# Patient Record
Sex: FEMALE | Race: WHITE | NOT HISPANIC OR LATINO | URBAN - METROPOLITAN AREA
[De-identification: names, ages, dates, MRNs, and addresses within clinical notes are randomized per-mention and may not be internally consistent; named-entity substitution may affect disease eponyms.]

---

## 2018-01-25 NOTE — PATIENT DISCUSSION
Discussed the risks/benefits of laser capsulotomy. Laser recommended. Patient elects to proceed. Plan OD then OS.

## 2018-02-21 NOTE — PROCEDURE NOTE: SURGICAL
Prior to commencing surgery patient identification, surgical procedure, site, and side were confirmed by Dr. Mary Hansen. Following topical proparacaine anesthesia, the patient was positioned at the YAG laser, a contact lens coupled to the cornea with methylcellulose and an axial posterior capsulotomy performed without complication using 2.3 Mj x 47. Excess methylcellulose was washed from the eye, one drop of Alphagan was instilled and the patient returned to the holding area having tolerated the procedure well and without complication. <br />[facility]:[id number]

## 2018-02-28 NOTE — PROCEDURE NOTE: SURGICAL
Prior to commencing surgery patient identification, surgical procedure, site, and side were confirmed by Dr. Sherice España. Following topical proparacaine anesthesia, the patient was positioned at the YAG laser, a contact lens coupled to the cornea with methylcellulose and an axial posterior capsulotomy performed without complication using 2.6 Mj x 48. Excess methylcellulose was washed from the eye, one drop of Alphagan was instilled and the patient returned to the holding area having tolerated the procedure well and without complication. <br />[facility] [place]

## 2020-07-16 NOTE — PATIENT DISCUSSION
RECOMMEND LOWER IOP OU.  HOLD LATANOPROST.  START ROCKLATAN OU QHS.   CONTINUE BRIMONIDINE.  IF IOP NOT CONTROLLED, TC REPEATING SLT OR GLC SURGERY.

## 2020-08-13 NOTE — PATIENT DISCUSSION
GOOD RESPONSE TO Rosie Free.    CONTINUE CURRENT MANAGEMENT.  IF PT IS UNABLE TO GET ROCKLATAN OR RHOPRESSA AND LATANOPROST, TC REPEATING SLT VS GLC SX.

## 2020-10-21 NOTE — PATIENT DISCUSSION
Surgery Counseling:I have discussed the option of scheduling surgery versus following, as well as the risks, benefits and alternatives of cataract surgery with the patient. It was explained that the surgery is medically indicated at this time, and it can be performed at the patient's option as delaying will cause no further deterioration, therefore there is no rush and there is no harm in waiting to have surgery. It was also explained that there is no guarantee that removing the cataract will improve their vision. The patient understands and desires to proceed with cataract surgery with the implantation of an intraocular lens to improve vision for ___reading and driving___. I have given the patient the prescribed regimen of Prolensa ( generic alternative bromfenac 0.09%), Prednisolone and Ofloxacin. Patient to administer as directed.

## 2020-10-21 NOTE — PATIENT DISCUSSION
CATARACTS, OU - VISUALLY SIGNIFICANT. SCHEDULE _os_ FIRST THEN LATER IN _od_ DISCUSSED OPTION OF ___STANDARD OU___. PATIENT UNDERSTANDS AND DESIRES ___STANDARD OU___.

## 2020-11-13 NOTE — PATIENT DISCUSSION
COUNSELING: New Rx for this medication with 1 year supply written at [address] [place] [address] on 3/20/17. Noted that script was printed, not submitted electronically. Contacted pharmacy and they confirmed that they did receive that Rx, and patient has remaining refills.   They will fill

## 2020-11-24 NOTE — PATIENT DISCUSSION
HVF BASELINE OU. WILL CONTINUE CURRENT MANAGEMENT FOR NOW. PT MAY NEED GLAUCOMA SX IN THE FUTURE. WE MAY INTRODUCE TO DR. Ziggy Falk NEXT VISIT IF STABLE.

## 2020-11-30 NOTE — PATIENT DISCUSSION
S/P PE IOL, _OS_. DOING WELL. CONTINUE PROLENSA(OR GENERIC BROMFENAC) ONCE DAILY IN OPERATIVE EYE  FOR 2 WEEKS THEN DISCONTINUE. TAPER PREDNISOLONE TWICE DAILY FOR 1 WEEK AND 1 ONCE DAILY FOR 1 WEEK THEN DISCONTINUE. Maria Luz [place] PATIENT DESIRES _STANDARD__IOL FOR 2ND EYE. SCHEDULE CATARACT SURGERY.

## 2020-11-30 NOTE — PATIENT DISCUSSION
Pre-Op 2nd Eye Counseling: The patient has noticed an improvement in their visual symptoms in the operative eye. The patient complains of decreased vision in the fellow eye when __DRIVIND / TV__. It was explained to the patient that the decision to proceed with cataract surgery in the fellow eye is entirely a separate decision from the surgical eye. All of the same risks, benefits and alternatives are reviewed with the patient again. The patient does feel the vision in the non-operative eye is limiting their daily activities and elects to proceed with cataract surgery in the __RIGHT__ eye. . I have given the patient the prescribed regimen of  drops to use before and after cataract surgery. Patient to administer as directed.

## 2021-10-15 ENCOUNTER — IMPORTED ENCOUNTER (OUTPATIENT)
Dept: URBAN - METROPOLITAN AREA CLINIC 1 | Facility: CLINIC | Age: 66
End: 2021-10-15

## 2021-10-15 PROBLEM — H16.223: Noted: 2021-10-15

## 2021-10-15 PROBLEM — H40.023: Noted: 2021-10-15

## 2021-10-15 PROBLEM — H04.123: Noted: 2021-10-15

## 2021-10-15 PROBLEM — H25.13: Noted: 2021-10-15

## 2021-10-15 PROCEDURE — 92014 COMPRE OPH EXAM EST PT 1/>: CPT

## 2021-10-15 PROCEDURE — 92015 DETERMINE REFRACTIVE STATE: CPT

## 2021-10-15 NOTE — PATIENT DISCUSSION
1.  Glaucoma Suspect OU (CD: 0.30 0.30) -  IOP stable (14/17) on Latanoprost QHS OU. CPM. Patient is considered high risk. Condition was discussed with patient and patient understands. Will continue to monitor patient for any progression in condition. Patient was advised to call us with any problems questions or concerns. 2.  Cataract OU - Observe for now without intervention. The patient was advised to contact us if any change or worsening of vision. 3. Dry Eyes OU - Recommend the use of ATs TID OU routinely (sample of Blink given). MRX given. REturn for an appointment in 6 months for 10/HVF with Dr. Zahraa Alicea.

## 2022-04-02 ASSESSMENT — TONOMETRY
OD_IOP_MMHG: 14
OS_IOP_MMHG: 17

## 2022-04-02 ASSESSMENT — VISUAL ACUITY
OD_CC: 20/100
OU_CC: J1+
OS_SC: 20/20

## 2022-05-06 ENCOUNTER — FOLLOW UP (OUTPATIENT)
Dept: URBAN - METROPOLITAN AREA CLINIC 2 | Facility: CLINIC | Age: 67
End: 2022-05-06

## 2022-05-06 DIAGNOSIS — H40.023: ICD-10-CM

## 2022-05-06 DIAGNOSIS — H04.123: ICD-10-CM

## 2022-05-06 DIAGNOSIS — H25.13: ICD-10-CM

## 2022-05-06 PROCEDURE — 92083 EXTENDED VISUAL FIELD XM: CPT

## 2022-05-06 PROCEDURE — 99213 OFFICE O/P EST LOW 20 MIN: CPT

## 2022-05-06 ASSESSMENT — VISUAL ACUITY
OD_CC: 20/20-1
OS_CC: 20/20

## 2022-05-06 ASSESSMENT — TONOMETRY
OS_IOP_MMHG: 17
OD_IOP_MMHG: 15

## 2022-05-06 NOTE — PATIENT DISCUSSION
(CD: 0.30 0.30) - IOP stable (15/17) on Latanoprost QHS OU (erx'd). HVF WNL OU. Patient is considered high risk. Condition was discussed with patient and patient understands. Will continue to monitor patient for any progression in condition. Patient was advised to call us with any problems questions or concerns.

## 2022-11-30 ENCOUNTER — COMPREHENSIVE EXAM (OUTPATIENT)
Dept: URBAN - METROPOLITAN AREA CLINIC 2 | Facility: CLINIC | Age: 67
End: 2022-11-30

## 2022-11-30 DIAGNOSIS — H25.13: ICD-10-CM

## 2022-11-30 DIAGNOSIS — H02.831: ICD-10-CM

## 2022-11-30 DIAGNOSIS — H16.143: ICD-10-CM

## 2022-11-30 DIAGNOSIS — H04.123: ICD-10-CM

## 2022-11-30 DIAGNOSIS — H40.1131: ICD-10-CM

## 2022-11-30 DIAGNOSIS — H02.834: ICD-10-CM

## 2022-11-30 PROCEDURE — 92133 CPTRZD OPH DX IMG PST SGM ON: CPT

## 2022-11-30 PROCEDURE — 92015 DETERMINE REFRACTIVE STATE: CPT

## 2022-11-30 PROCEDURE — 92014 COMPRE OPH EXAM EST PT 1/>: CPT

## 2022-11-30 ASSESSMENT — VISUAL ACUITY
OD_CC: 20/20
OS_CC: 20/20

## 2022-11-30 ASSESSMENT — TONOMETRY
OD_IOP_MMHG: 16
OS_IOP_MMHG: 16

## 2022-11-30 NOTE — PATIENT DISCUSSION
C/D: 0.3 OU. IOP stable today at 16 OU. OCT ON performed today is stable and WNL OU. Advised the patient to continue the use of latanoprost QHS OU and stressed drop compliance. Condition was discussed with patient and patient understands. Will continue to monitor patient for any progression in condition. Patient was advised to call us with any problems, questions, or concerns.

## 2022-11-30 NOTE — PATIENT DISCUSSION
Glasses RX given to patient today. Will observe for now without intervention. The patient was advised to contact office with any change or worsening of vision.

## 2023-06-02 ENCOUNTER — FOLLOW UP (OUTPATIENT)
Dept: URBAN - METROPOLITAN AREA CLINIC 2 | Facility: CLINIC | Age: 68
End: 2023-06-02

## 2023-06-02 DIAGNOSIS — H40.1131: ICD-10-CM

## 2023-06-02 PROCEDURE — 92083 EXTENDED VISUAL FIELD XM: CPT

## 2023-06-02 PROCEDURE — 92012 INTRM OPH EXAM EST PATIENT: CPT

## 2023-06-02 ASSESSMENT — TONOMETRY
OS_IOP_MMHG: 16
OD_IOP_MMHG: 16

## 2023-06-02 ASSESSMENT — VISUAL ACUITY
OD_CC: 20/20
OS_CC: 20/20

## 2023-06-06 ENCOUNTER — COMPREHENSIVE EXAM (OUTPATIENT)
Dept: URBAN - METROPOLITAN AREA CLINIC 2 | Facility: CLINIC | Age: 68
End: 2023-06-06

## 2023-06-06 DIAGNOSIS — H52.221: ICD-10-CM

## 2023-06-06 DIAGNOSIS — H52.4: ICD-10-CM

## 2023-06-06 DIAGNOSIS — H52.13: ICD-10-CM

## 2023-06-06 PROCEDURE — 92015 DETERMINE REFRACTIVE STATE: CPT

## 2023-06-06 PROCEDURE — 92310-12 CONTACT LENS FITTING - 90

## 2023-06-06 PROCEDURE — 92014 COMPRE OPH EXAM EST PT 1/>: CPT

## 2023-06-06 ASSESSMENT — KERATOMETRY
OS_K1POWER_DIOPTERS: 42.75
OD_K2POWER_DIOPTERS: 43.00
OS_K2POWER_DIOPTERS: 42.75
OD_AXISANGLE_DEGREES: 165
OD_AXISANGLE2_DEGREES: 075
OD_K1POWER_DIOPTERS: 42.00

## 2023-06-06 ASSESSMENT — TONOMETRY
OD_IOP_MMHG: 17
OS_IOP_MMHG: 17

## 2023-06-06 ASSESSMENT — VISUAL ACUITY
OD_SC: J1
OS_CC: 20/20
OS_CC: 20/20
OD_CC: 20/20-2

## 2023-09-28 ENCOUNTER — EMERGENCY VISIT (OUTPATIENT)
Dept: URBAN - METROPOLITAN AREA CLINIC 2 | Facility: CLINIC | Age: 68
End: 2023-09-28

## 2023-09-28 DIAGNOSIS — H20.012: ICD-10-CM

## 2023-09-28 PROCEDURE — 99213 OFFICE O/P EST LOW 20 MIN: CPT

## 2023-09-28 RX ORDER — PREDNISOLONE ACETATE 10 MG/ML: 1 SUSPENSION/ DROPS OPHTHALMIC

## 2023-09-28 ASSESSMENT — KERATOMETRY
OD_K2POWER_DIOPTERS: 43.00
OS_K1POWER_DIOPTERS: 42.75
OD_AXISANGLE_DEGREES: 165
OD_AXISANGLE2_DEGREES: 075
OD_K1POWER_DIOPTERS: 42.00
OS_K2POWER_DIOPTERS: 42.75

## 2023-09-28 ASSESSMENT — VISUAL ACUITY
OD_CC: 20/20
OS_CC: 20/20

## 2023-09-28 ASSESSMENT — TONOMETRY
OD_IOP_MMHG: 15
OS_IOP_MMHG: 16

## 2023-10-06 ENCOUNTER — FOLLOW UP (OUTPATIENT)
Dept: URBAN - METROPOLITAN AREA CLINIC 2 | Facility: CLINIC | Age: 68
End: 2023-10-06

## 2023-10-06 DIAGNOSIS — H20.012: ICD-10-CM

## 2023-10-06 PROCEDURE — 99213 OFFICE O/P EST LOW 20 MIN: CPT

## 2023-10-06 ASSESSMENT — VISUAL ACUITY
OS_CC: 20/20
OD_CC: 20/20

## 2023-10-06 ASSESSMENT — KERATOMETRY
OD_AXISANGLE2_DEGREES: 075
OD_AXISANGLE_DEGREES: 165
OD_K1POWER_DIOPTERS: 42.00
OD_K2POWER_DIOPTERS: 43.00
OS_K2POWER_DIOPTERS: 42.75
OS_K1POWER_DIOPTERS: 42.75

## 2023-10-06 ASSESSMENT — TONOMETRY
OS_IOP_MMHG: 14
OD_IOP_MMHG: 12

## 2023-12-08 ENCOUNTER — COMPREHENSIVE EXAM (OUTPATIENT)
Dept: URBAN - METROPOLITAN AREA CLINIC 2 | Facility: CLINIC | Age: 68
End: 2023-12-08

## 2023-12-08 DIAGNOSIS — H40.1131: ICD-10-CM

## 2023-12-08 DIAGNOSIS — H25.89: ICD-10-CM

## 2023-12-08 DIAGNOSIS — H25.813: ICD-10-CM

## 2023-12-08 PROCEDURE — 92133 CPTRZD OPH DX IMG PST SGM ON: CPT

## 2023-12-08 PROCEDURE — 99214 OFFICE O/P EST MOD 30 MIN: CPT

## 2023-12-08 ASSESSMENT — KERATOMETRY
OS_K2POWER_DIOPTERS: 42.75
OD_K1POWER_DIOPTERS: 42.00
OD_AXISANGLE2_DEGREES: 075
OD_AXISANGLE_DEGREES: 165
OS_K1POWER_DIOPTERS: 42.75
OD_K2POWER_DIOPTERS: 43.00

## 2023-12-08 ASSESSMENT — TONOMETRY
OD_IOP_MMHG: 13
OS_IOP_MMHG: 13

## 2023-12-08 ASSESSMENT — VISUAL ACUITY
OD_CC: 20/20
OS_CC: 20/20

## 2024-06-14 ENCOUNTER — FOLLOW UP (OUTPATIENT)
Dept: URBAN - METROPOLITAN AREA CLINIC 2 | Facility: CLINIC | Age: 69
End: 2024-06-14

## 2024-06-14 DIAGNOSIS — H40.1131: ICD-10-CM

## 2024-06-14 PROCEDURE — 92012 INTRM OPH EXAM EST PATIENT: CPT

## 2024-06-14 ASSESSMENT — TONOMETRY
OD_IOP_MMHG: 14
OS_IOP_MMHG: 14

## 2024-06-14 ASSESSMENT — KERATOMETRY
OD_K2POWER_DIOPTERS: 43.00
OS_K2POWER_DIOPTERS: 42.75
OD_AXISANGLE2_DEGREES: 075
OD_K1POWER_DIOPTERS: 42.00
OS_K1POWER_DIOPTERS: 42.75
OD_AXISANGLE_DEGREES: 165

## 2024-06-14 ASSESSMENT — VISUAL ACUITY
OU_CC: 20/25
OD_SC: 20/70-2
OS_CC: 20/20-2

## 2024-08-13 ENCOUNTER — COMPREHENSIVE EXAM (OUTPATIENT)
Dept: URBAN - METROPOLITAN AREA CLINIC 1 | Facility: CLINIC | Age: 69
End: 2024-08-13

## 2024-08-13 DIAGNOSIS — Z01.00: ICD-10-CM

## 2024-08-13 DIAGNOSIS — H52.13: ICD-10-CM

## 2024-08-13 DIAGNOSIS — H52.221: ICD-10-CM

## 2024-08-13 DIAGNOSIS — H52.4: ICD-10-CM

## 2024-08-13 DIAGNOSIS — Z46.0: ICD-10-CM

## 2024-08-13 PROCEDURE — 92014 COMPRE OPH EXAM EST PT 1/>: CPT

## 2024-08-13 PROCEDURE — 92310-2 LEVEL 2 CONTACT LENS MANAGEMENT

## 2024-08-13 PROCEDURE — 92015 DETERMINE REFRACTIVE STATE: CPT

## 2024-08-13 ASSESSMENT — KERATOMETRY
OD_AXISANGLE_DEGREES: 165
OS_K1POWER_DIOPTERS: 42.75
OS_K2POWER_DIOPTERS: 42.75
OD_K2POWER_DIOPTERS: 43.00
OD_K1POWER_DIOPTERS: 42.00
OD_AXISANGLE2_DEGREES: 075

## 2024-08-13 ASSESSMENT — TONOMETRY
OS_IOP_MMHG: 14
OD_IOP_MMHG: 13

## 2024-08-13 ASSESSMENT — VISUAL ACUITY
OS_CC: 20/20
OD_CC: J1

## 2024-12-13 ENCOUNTER — FOLLOW UP (OUTPATIENT)
Age: 69
End: 2024-12-13

## 2024-12-13 DIAGNOSIS — H25.89: ICD-10-CM

## 2024-12-13 DIAGNOSIS — H40.1131: ICD-10-CM

## 2024-12-13 DIAGNOSIS — H16.223: ICD-10-CM

## 2024-12-13 DIAGNOSIS — H25.813: ICD-10-CM

## 2024-12-13 PROCEDURE — 92133 CPTRZD OPH DX IMG PST SGM ON: CPT

## 2024-12-13 PROCEDURE — 92014 COMPRE OPH EXAM EST PT 1/>: CPT

## 2025-01-10 ENCOUNTER — CONSULTATION/EVALUATION (OUTPATIENT)
Age: 70
End: 2025-01-10

## 2025-01-10 VITALS
HEART RATE: 42 BPM | SYSTOLIC BLOOD PRESSURE: 111 MMHG | HEIGHT: 63 IN | WEIGHT: 130 LBS | BODY MASS INDEX: 23.04 KG/M2 | DIASTOLIC BLOOD PRESSURE: 68 MMHG

## 2025-01-10 DIAGNOSIS — H25.89: ICD-10-CM

## 2025-01-10 DIAGNOSIS — H25.813: ICD-10-CM

## 2025-01-10 PROCEDURE — 92002 INTRM OPH EXAM NEW PATIENT: CPT

## 2025-01-10 PROCEDURE — 92025 CPTRIZED CORNEAL TOPOGRAPHY: CPT

## 2025-01-10 PROCEDURE — 92136 OPHTHALMIC BIOMETRY: CPT

## 2025-01-15 ENCOUNTER — PRE-OP/H&P (OUTPATIENT)
Age: 70
End: 2025-01-15

## 2025-01-15 VITALS
BODY MASS INDEX: 23.04 KG/M2 | DIASTOLIC BLOOD PRESSURE: 68 MMHG | SYSTOLIC BLOOD PRESSURE: 118 MMHG | HEART RATE: 42 BPM | WEIGHT: 130 LBS | HEIGHT: 63 IN

## 2025-01-15 DIAGNOSIS — H25.813: ICD-10-CM

## 2025-01-15 PROCEDURE — 99499 UNLISTED E&M SERVICE: CPT

## 2025-01-15 PROCEDURE — 92025 CPTRIZED CORNEAL TOPOGRAPHY: CPT | Mod: NC

## 2025-01-15 PROCEDURE — 92136 OPHTHALMIC BIOMETRY: CPT

## 2025-01-21 ENCOUNTER — SURGERY/PROCEDURE (OUTPATIENT)
Age: 70
End: 2025-01-21

## 2025-01-21 DIAGNOSIS — H25.812: ICD-10-CM

## 2025-01-21 DIAGNOSIS — H40.1121: ICD-10-CM

## 2025-01-21 PROCEDURE — 66991 XCAPSL CTRC RMVL INSJ 1+: CPT

## 2025-01-24 ENCOUNTER — POST-OP (OUTPATIENT)
Age: 70
End: 2025-01-24

## 2025-01-24 DIAGNOSIS — Z96.1: ICD-10-CM

## 2025-01-24 PROCEDURE — 66999PO NON CO-MANAGED OTHER SURGERY PO

## 2025-01-29 ENCOUNTER — POST OP/EVAL OF SECOND EYE (OUTPATIENT)
Age: 70
End: 2025-01-29

## 2025-01-29 DIAGNOSIS — Z96.1: ICD-10-CM

## 2025-02-04 ENCOUNTER — SURGERY/PROCEDURE (OUTPATIENT)
Age: 70
End: 2025-02-04

## 2025-02-04 DIAGNOSIS — H40.1111: ICD-10-CM

## 2025-02-04 DIAGNOSIS — H25.811: ICD-10-CM

## 2025-02-04 PROCEDURE — 66991 XCAPSL CTRC RMVL INSJ 1+: CPT | Mod: 79,RT

## 2025-02-05 ENCOUNTER — POST-OP (OUTPATIENT)
Age: 70
End: 2025-02-05

## 2025-02-05 DIAGNOSIS — Z96.1: ICD-10-CM

## 2025-02-12 ENCOUNTER — POST-OP (OUTPATIENT)
Age: 70
End: 2025-02-12

## 2025-02-12 DIAGNOSIS — Z98.890: ICD-10-CM

## 2025-02-12 PROCEDURE — P6698455 NON-COMANAGED ADVANCED PO

## 2025-02-12 RX ORDER — PREDNISOLONE ACETATE 10 MG/ML: 1 SUSPENSION/ DROPS OPHTHALMIC

## 2025-02-12 RX ORDER — KETOROLAC TROMETHAMINE 4 MG/ML: 1 SOLUTION/ DROPS OPHTHALMIC

## 2025-03-26 ENCOUNTER — POST-OP (OUTPATIENT)
Age: 70
End: 2025-03-26

## 2025-03-26 DIAGNOSIS — Z98.890: ICD-10-CM

## 2025-03-26 DIAGNOSIS — H40.1131: ICD-10-CM

## 2025-03-26 PROCEDURE — P6698455 NON-COMANAGED ADVANCED PO
